# Patient Record
Sex: MALE | Race: WHITE | NOT HISPANIC OR LATINO | Employment: FULL TIME | URBAN - METROPOLITAN AREA
[De-identification: names, ages, dates, MRNs, and addresses within clinical notes are randomized per-mention and may not be internally consistent; named-entity substitution may affect disease eponyms.]

---

## 2017-06-07 ENCOUNTER — TELEPHONE (OUTPATIENT)
Dept: MEDICAL GROUP | Facility: MEDICAL CENTER | Age: 62
End: 2017-06-07

## 2017-06-07 DIAGNOSIS — K62.5 RECTAL BLEEDING: ICD-10-CM

## 2017-09-20 ENCOUNTER — HOSPITAL ENCOUNTER (OUTPATIENT)
Facility: MEDICAL CENTER | Age: 62
End: 2017-09-20
Payer: COMMERCIAL

## 2017-09-20 ENCOUNTER — APPOINTMENT (OUTPATIENT)
Dept: SOCIAL WORK | Facility: CLINIC | Age: 62
End: 2017-09-20

## 2017-09-20 LAB
ANION GAP SERPL CALC-SCNC: 10 MMOL/L (ref 0–11.9)
BUN SERPL-MCNC: 14 MG/DL (ref 8–22)
CALCIUM SERPL-MCNC: 9.3 MG/DL (ref 8.5–10.5)
CHLORIDE SERPL-SCNC: 108 MMOL/L (ref 96–112)
CHOLEST SERPL-MCNC: 213 MG/DL (ref 100–199)
CO2 SERPL-SCNC: 24 MMOL/L (ref 20–33)
CREAT SERPL-MCNC: 0.89 MG/DL (ref 0.5–1.4)
GFR SERPL CREATININE-BSD FRML MDRD: >60 ML/MIN/1.73 M 2
GLUCOSE SERPL-MCNC: 87 MG/DL (ref 65–99)
HDLC SERPL-MCNC: 29 MG/DL
LDLC SERPL CALC-MCNC: 109 MG/DL
POTASSIUM SERPL-SCNC: 3.8 MMOL/L (ref 3.6–5.5)
PSA SERPL-MCNC: 1.57 NG/ML (ref 0–4)
SODIUM SERPL-SCNC: 142 MMOL/L (ref 135–145)
TRIGL SERPL-MCNC: 375 MG/DL (ref 0–149)

## 2017-09-20 PROCEDURE — 90686 IIV4 VACC NO PRSV 0.5 ML IM: CPT | Performed by: REGISTERED NURSE

## 2017-09-25 ENCOUNTER — TELEPHONE (OUTPATIENT)
Dept: MEDICAL GROUP | Facility: MEDICAL CENTER | Age: 62
End: 2017-09-25

## 2017-09-25 NOTE — LETTER
September 25, 2017        Wilfredo Montanez  [address] [facility] [place] [address]        Dear Wilrfedo Nichole:    After careful review of your chart, we have noted you are due for a follow up appointment.  We request you call our office at [phone] at your earliest convenience and make an appointment.      We look forward to scheduling an appointment for you, so that we may provide you with the safest and most complete medical care.        If you have any questions or concerns, please don't hesitate to call.        Sincerely,        LISSY Norman.    Electronically Signed

## 2017-09-25 NOTE — TELEPHONE ENCOUNTER
----- Message from DANIEL Norman sent at 9/22/2017  8:58 PM PDT -----  Please have pt set appointment to review and discuss treatment for labs.

## 2018-02-06 ENCOUNTER — OFFICE VISIT (OUTPATIENT)
Dept: MEDICAL GROUP | Facility: MEDICAL CENTER | Age: 63
End: 2018-02-06
Payer: COMMERCIAL

## 2018-02-06 VITALS
HEIGHT: 72 IN | TEMPERATURE: 97.7 F | OXYGEN SATURATION: 90 % | WEIGHT: 282 LBS | BODY MASS INDEX: 38.19 KG/M2 | DIASTOLIC BLOOD PRESSURE: 70 MMHG | HEART RATE: 67 BPM | SYSTOLIC BLOOD PRESSURE: 110 MMHG

## 2018-02-06 DIAGNOSIS — K92.1 GASTROINTESTINAL HEMORRHAGE WITH MELENA: ICD-10-CM

## 2018-02-06 DIAGNOSIS — K92.1 HEMATOCHEZIA: ICD-10-CM

## 2018-02-06 DIAGNOSIS — E78.5 HYPERLIPIDEMIA LDL GOAL <100: ICD-10-CM

## 2018-02-06 DIAGNOSIS — E66.9 OBESITY (BMI 30-39.9): ICD-10-CM

## 2018-02-06 PROCEDURE — 99214 OFFICE O/P EST MOD 30 MIN: CPT | Performed by: NURSE PRACTITIONER

## 2018-02-06 RX ORDER — GEMFIBROZIL 600 MG/1
600 TABLET, FILM COATED ORAL 2 TIMES DAILY
Qty: 60 TAB | Refills: 2 | Status: SHIPPED | OUTPATIENT
Start: 2018-02-06 | End: 2018-05-14 | Stop reason: SDUPTHER

## 2018-02-06 ASSESSMENT — PATIENT HEALTH QUESTIONNAIRE - PHQ9: CLINICAL INTERPRETATION OF PHQ2 SCORE: 0

## 2018-02-07 NOTE — PROGRESS NOTES
Subjective:     Wilfredo Montanez is a 62 y.o. male who presents with   Chief Complaint   Patient presents with   • Results     Reviewing labs   .    HPI:     Seen in f/u for rectal bleeding.  He has had this since the last year.  He had a referral to GI.    He has lost multiple family members over the last year. Was unable to find time to sched with GI.  Having intermittent bleeding.  No black tarry stools. The blood is on the stool.  Usually only occurs with hard stool.  He is on a high fiber diet. If he goes off his diet he will have the bleeding.  No abd or back pain.  No hemorrhoids that he is aware of.  He has myalgias and weakness to statins.  He is unable to take any statins.   Reviewed lab with pt.  His BMP, GFR is wnl  LP shows trg are up to 375.  HDL is very low at 29.  LDL is ok at 109.  This was done in 9/17.  He just started almonds.  Hopes that will raise his HDL.  He is gaining wt so doesn't think his LP will be improved.  If trg are still elevated will start lopid.  Reviewed risks and benefits of lopid with pt including but not limited to: myalgias and elevated lft          Patient Active Problem List    Diagnosis Date Noted   • Obesity (BMI 30-39.9) 02/06/2018   • Cholinesterase deficiency (CMS-HCC)    • Preventative health care 06/14/2012   • GERD (gastroesophageal reflux disease)    • Skin cancer    • Hyperlipidemia    • Ischemic colitis (CMS-[facility])    • Obesity 06/01/2012   • Dyslipidemia 06/01/2012       Current medicines (including changes today)  Current Outpatient Prescriptions   Medication Sig Dispense Refill   • gemfibrozil (LOPID) 600 MG Tab Take 1 Tab by mouth 2 times a day. 60 Tab 2   • Cholecalciferol (VITAMIN D3) 3000 UNITS TABS Take  by mouth.     • aspirin (ASA) 325 MG TABS Take 325 mg by mouth as needed.     • docosahexanoic acid (OMEGA 3 FA) 1000 MG CAPS Take 1,000 mg by mouth 3 times a day, with meals.     • glucosamine Sulfate 500 MG CAPS Take 500 mg by mouth 3 times a day, with  "meals.     • ascorbic acid (ASCORBIC ACID) 500 MG TABS Take 500 mg by mouth every day.       No current facility-administered medications for this visit.        Allergies   Allergen Reactions   • Codeine    • Statins [Hmg-Coa-R Inhibitors]        ROS  Constitutional: Negative. Negative for fever, chills, weight loss, malaise/fatigue and diaphoresis.   HENT: Negative. Negative for hearing loss, ear pain, nosebleeds, congestion, sore throat, neck pain, tinnitus and ear discharge.   Respiratory: Negative. Negative for cough, hemoptysis, sputum production, shortness of breath, wheezing and stridor.   Cardiovascular: Negative. Negative for chest pain, palpitations, orthopnea, claudication, leg swelling and PND.   Gastrointestinal: Denies nausea, vomiting, diarrhea, constipation, heartburn, melena or hematochezia.  Genitourinary: Denies dysuria, hematuria, urinary incontinence, frequency or urgency.        Objective:     Blood pressure 110/70, pulse 67, temperature 36.5 °C (97.7 °F), height 1.819 m (5' 11.6\"), weight (!) 127.9 kg (282 lb), SpO2 90 %. Body mass index is 38.67 kg/m².    Physical Exam:  Vitals reviewed.  Constitutional: Oriented to person, place, and time. appears well-developed and well-nourished. No distress.   Cardiovascular: Normal rate, regular rhythm, normal heart sounds and intact distal pulses. Exam reveals no gallop and no friction rub. No murmur heard. No carotid bruits.   Pulmonary/Chest: Effort normal and breath sounds normal. No stridor. No respiratory distress. no wheezes or rales. exhibits no tenderness.   Musculoskeletal: Normal range of motion. exhibits no edema. bert pedal pulses 2+.  Lymphadenopathy: No cervical or supraclavicular adenopathy.   Abd:  Bx + x4.  No HSM or abd tenderenss. No CVA tenderness  Neurological: Alert and oriented to person, place, and time. exhibits normal muscle tone.  Skin: Skin is warm and dry. No diaphoresis.   Psychiatric: Normal mood and affect. Behavior is " normal.      Assessment and Plan:     The following treatment plan was discussed:    1. Gastrointestinal hemorrhage with melena  REFERRAL TO GASTROENTEROLOGY    refer GI for evaluation he states that he will f/u with referral this year.  reviewed risk of colon cancer   2. Hematochezia  REFERRAL TO GASTROENTEROLOGY   3. Hyperlipidemia LDL goal <100  LIPID PROFILE    gemfibrozil (LOPID) 600 MG Tab    repeat LP.  if trg still up will start lopid i po daily.  if no s/e inc to bid.  then check CMP,LP WHEN on med for 6 weeks bid.  f/u for lab review   4. Obesity (BMI 30-39.9)  Patient identified as having weight management issue.  Appropriate orders and counseling given.    not on healthy diet or exercising.  enc improvment in both.         Followup: Return in about 6 weeks (around 3/20/2018).

## 2018-02-10 ENCOUNTER — HOSPITAL ENCOUNTER (OUTPATIENT)
Dept: LAB | Facility: MEDICAL CENTER | Age: 63
End: 2018-02-10
Attending: NURSE PRACTITIONER
Payer: COMMERCIAL

## 2018-02-10 DIAGNOSIS — E78.5 HYPERLIPIDEMIA LDL GOAL <100: ICD-10-CM

## 2018-02-10 LAB
CHOLEST SERPL-MCNC: 201 MG/DL (ref 100–199)
HDLC SERPL-MCNC: 31 MG/DL
LDLC SERPL CALC-MCNC: 118 MG/DL
TRIGL SERPL-MCNC: 261 MG/DL (ref 0–149)

## 2018-02-10 PROCEDURE — 36415 COLL VENOUS BLD VENIPUNCTURE: CPT

## 2018-02-10 PROCEDURE — 80061 LIPID PANEL: CPT

## 2018-02-12 ENCOUNTER — TELEPHONE (OUTPATIENT)
Dept: MEDICAL GROUP | Facility: MEDICAL CENTER | Age: 63
End: 2018-02-12

## 2018-02-12 NOTE — TELEPHONE ENCOUNTER
----- Message from DANIEL Lino sent at 2/11/2018  6:07 PM PST -----  Please have pt set appointment to review and discuss treatment for labs.

## 2018-02-20 DIAGNOSIS — E78.5 HYPERLIPIDEMIA LDL GOAL <100: ICD-10-CM

## 2018-04-28 ENCOUNTER — HOSPITAL ENCOUNTER (OUTPATIENT)
Dept: LAB | Facility: MEDICAL CENTER | Age: 63
End: 2018-04-28
Attending: NURSE PRACTITIONER
Payer: COMMERCIAL

## 2018-04-28 LAB
ALBUMIN SERPL BCP-MCNC: 4.3 G/DL (ref 3.2–4.9)
ALBUMIN/GLOB SERPL: 1.6 G/DL
ALP SERPL-CCNC: 43 U/L (ref 30–99)
ALT SERPL-CCNC: 17 U/L (ref 2–50)
ANION GAP SERPL CALC-SCNC: 7 MMOL/L (ref 0–11.9)
AST SERPL-CCNC: 14 U/L (ref 12–45)
BILIRUB SERPL-MCNC: 0.8 MG/DL (ref 0.1–1.5)
BUN SERPL-MCNC: 21 MG/DL (ref 8–22)
CALCIUM SERPL-MCNC: 8.7 MG/DL (ref 8.5–10.5)
CHLORIDE SERPL-SCNC: 109 MMOL/L (ref 96–112)
CHOLEST SERPL-MCNC: 206 MG/DL (ref 100–199)
CO2 SERPL-SCNC: 25 MMOL/L (ref 20–33)
CREAT SERPL-MCNC: 0.97 MG/DL (ref 0.5–1.4)
GLOBULIN SER CALC-MCNC: 2.7 G/DL (ref 1.9–3.5)
GLUCOSE SERPL-MCNC: 99 MG/DL (ref 65–99)
HDLC SERPL-MCNC: 32 MG/DL
LDLC SERPL CALC-MCNC: 139 MG/DL
POTASSIUM SERPL-SCNC: 4 MMOL/L (ref 3.6–5.5)
PROT SERPL-MCNC: 7 G/DL (ref 6–8.2)
SODIUM SERPL-SCNC: 141 MMOL/L (ref 135–145)
TRIGL SERPL-MCNC: 175 MG/DL (ref 0–149)

## 2018-04-28 PROCEDURE — 36415 COLL VENOUS BLD VENIPUNCTURE: CPT

## 2018-04-28 PROCEDURE — 80053 COMPREHEN METABOLIC PANEL: CPT

## 2018-04-28 PROCEDURE — 80061 LIPID PANEL: CPT

## 2018-04-30 ENCOUNTER — TELEPHONE (OUTPATIENT)
Dept: MEDICAL GROUP | Facility: MEDICAL CENTER | Age: 63
End: 2018-04-30

## 2018-04-30 NOTE — TELEPHONE ENCOUNTER
----- Message from DANIEL Lino sent at 4/29/2018  7:51 PM PDT -----  Please have pt set appointment to review and discuss treatment for labs.

## 2018-05-14 ENCOUNTER — OFFICE VISIT (OUTPATIENT)
Dept: MEDICAL GROUP | Facility: MEDICAL CENTER | Age: 63
End: 2018-05-14
Payer: COMMERCIAL

## 2018-05-14 VITALS
DIASTOLIC BLOOD PRESSURE: 82 MMHG | SYSTOLIC BLOOD PRESSURE: 120 MMHG | OXYGEN SATURATION: 92 % | TEMPERATURE: 99 F | HEIGHT: 72 IN | WEIGHT: 283 LBS | HEART RATE: 56 BPM | BODY MASS INDEX: 38.33 KG/M2

## 2018-05-14 DIAGNOSIS — E78.5 HYPERLIPIDEMIA LDL GOAL <100: ICD-10-CM

## 2018-05-14 PROCEDURE — 99214 OFFICE O/P EST MOD 30 MIN: CPT | Performed by: NURSE PRACTITIONER

## 2018-05-14 RX ORDER — GEMFIBROZIL 600 MG/1
600 TABLET, FILM COATED ORAL 2 TIMES DAILY
Qty: 180 TAB | Refills: 1 | Status: SHIPPED | OUTPATIENT
Start: 2018-05-14 | End: 2018-11-06 | Stop reason: SDUPTHER

## 2018-05-14 NOTE — PROGRESS NOTES
Subjective:     Wilfredo Montanez is a 62 y.o. male who presents with hyperlipidemia.    HPI:   Seen in f/u for hyperlipidemia.  He is on lopid.  Is concerned that it may have inc his BUN.  Otherwise dunia well.  Last visit his trg were up to 261.  He reports that its familial.  His HDL was too low.  LDL was too high at 118.   Reviewed new lab with pt.  Is CMP, GFR is wnl.  LP shows trg down to 175.  HDL up sl to 32.  LDL is up from 118 to 139 with goal of <100.  Intolerant of statins.  His trg were up to 375 in 9/17.    Is mostly on a healthy diet.  He has stopped eating fatty meats.  He is doing mild exercise 4x/wk.  Walks at work.      Patient Active Problem List    Diagnosis Date Noted   • Obesity (BMI 30-39.9) 02/06/2018   • Cholinesterase deficiency (HCC)    • Preventative health care 06/14/2012   • GERD (gastroesophageal reflux disease)    • Skin cancer    • Hyperlipidemia    • Ischemic colitis (HCC)    • Obesity 06/01/2012   • Dyslipidemia 06/01/2012       Current medicines (including changes today)  Current Outpatient Prescriptions   Medication Sig Dispense Refill   • gemfibrozil (LOPID) 600 MG Tab Take 1 Tab by mouth 2 times a day. 180 Tab 1   • Cholecalciferol (VITAMIN D3) 3000 UNITS TABS Take  by mouth.     • aspirin (ASA) 325 MG TABS Take 325 mg by mouth as needed.     • docosahexanoic acid (OMEGA 3 FA) 1000 MG CAPS Take 1,000 mg by mouth 3 times a day, with meals.     • glucosamine Sulfate 500 MG CAPS Take 500 mg by mouth 3 times a day, with meals.     • ascorbic acid (ASCORBIC ACID) 500 MG TABS Take 500 mg by mouth every day.       No current facility-administered medications for this visit.        Allergies   Allergen Reactions   • Codeine    • Statins [Hmg-Coa-R Inhibitors]        ROS  Constitutional: Negative. Negative for fever, chills, weight loss, malaise/fatigue and diaphoresis.   HENT: Negative. Negative for hearing loss, ear pain, nosebleeds, congestion, sore throat, neck pain, tinnitus and  "ear discharge.   Respiratory: Negative. Negative for cough, hemoptysis, sputum production, shortness of breath, wheezing and stridor.   Cardiovascular: Negative. Negative for chest pain, palpitations, orthopnea, claudication, leg swelling and PND.   Gastrointestinal: Denies nausea, vomiting, diarrhea, constipation, heartburn, melena or hematochezia.  Genitourinary: Denies dysuria, hematuria, urinary incontinence, frequency or urgency.        Objective:     Blood pressure 120/82, pulse (!) 56, temperature 37.2 °C (99 °F), height 1.819 m (5' 11.6\"), weight (!) 128.4 kg (283 lb), SpO2 92 %. Body mass index is 38.81 kg/m².    Physical Exam:  Vitals reviewed.  Constitutional: Oriented to person, place, and time. appears well-developed and well-nourished. No distress.   Cardiovascular: Normal rate, regular rhythm, normal heart sounds and intact distal pulses. Exam reveals no gallop and no friction rub. No murmur heard. No carotid bruits.   Pulmonary/Chest: Effort normal and breath sounds normal. No stridor. No respiratory distress. no wheezes or rales. exhibits no tenderness.   Musculoskeletal: Normal range of motion. exhibits no edema. bert pedal pulses 2+.  Lymphadenopathy: No cervical or supraclavicular adenopathy.   Neurological: Alert and oriented to person, place, and time. exhibits normal muscle tone.  Skin: Skin is warm and dry. No diaphoresis.   Psychiatric: Normal mood and affect. Behavior is normal.      Assessment and Plan:     The following treatment plan was discussed:    1. Hyperlipidemia LDL goal <100  gemfibrozil (LOPID) 600 MG Tab    trg down and better but not at goal.  improve diet and exercise.  HDL still low chronically.  LDL is coming up from 109 in 9/17 to 139           Followup: Return in about 6 months (around 11/14/2018).  "

## 2018-05-17 ENCOUNTER — OFFICE VISIT (OUTPATIENT)
Dept: MEDICAL GROUP | Facility: MEDICAL CENTER | Age: 63
End: 2018-05-17
Payer: COMMERCIAL

## 2018-05-17 VITALS
WEIGHT: 275 LBS | OXYGEN SATURATION: 94 % | SYSTOLIC BLOOD PRESSURE: 120 MMHG | DIASTOLIC BLOOD PRESSURE: 80 MMHG | TEMPERATURE: 98.7 F | HEART RATE: 70 BPM | HEIGHT: 72 IN | BODY MASS INDEX: 37.25 KG/M2

## 2018-05-17 DIAGNOSIS — M54.50 LOW BACK PAIN RADIATING TO RIGHT LEG: ICD-10-CM

## 2018-05-17 DIAGNOSIS — M79.604 LOW BACK PAIN RADIATING TO RIGHT LEG: ICD-10-CM

## 2018-05-17 PROCEDURE — 99214 OFFICE O/P EST MOD 30 MIN: CPT | Performed by: NURSE PRACTITIONER

## 2018-05-17 RX ORDER — TIZANIDINE 4 MG/1
4 TABLET ORAL
Qty: 15 TAB | Refills: 0 | Status: SHIPPED | OUTPATIENT
Start: 2018-05-17 | End: 2019-01-28

## 2018-05-17 RX ORDER — METHYLPREDNISOLONE 4 MG/1
TABLET ORAL
Qty: 21 TAB | Refills: 0 | Status: SHIPPED | OUTPATIENT
Start: 2018-05-17 | End: 2019-01-28

## 2018-05-17 NOTE — PROGRESS NOTES
Subjective:     Wilfredo Montanez is a 62 y.o. male who presents with LBP.    HPI:   Seen in f/u for LBP.  Yesterday am he woke up with back tightness and muscle spasms.  He went ahead to work and it got worse.  Pain in lower center of back. No leg pain today.    He has had rt anterior leg numbness for awhile. That was also noted yesterday with burning.    Didn't do to work today.    Using asa for the pain.    No saddle anesthesia.  No bowel or bladder incontinence.    No hx of injury.      Patient Active Problem List    Diagnosis Date Noted   • Obesity (BMI 30-39.9) 02/06/2018   • Cholinesterase deficiency (HCC)    • Preventative health care 06/14/2012   • GERD (gastroesophageal reflux disease)    • Skin cancer    • Hyperlipidemia    • Ischemic colitis (HCC)    • Obesity 06/01/2012   • Dyslipidemia 06/01/2012       Current medicines (including changes today)  Current Outpatient Prescriptions   Medication Sig Dispense Refill   • MethylPREDNISolone (MEDROL DOSEPAK) 4 MG Tablet Therapy Pack As directed on the packaging label. 21 Tab 0   • tizanidine (ZANAFLEX) 4 MG Tab Take 1 Tab by mouth every bedtime. 15 Tab 0   • gemfibrozil (LOPID) 600 MG Tab Take 1 Tab by mouth 2 times a day. 180 Tab 1   • Cholecalciferol (VITAMIN D3) 3000 UNITS TABS Take  by mouth.     • aspirin (ASA) 325 MG TABS Take 325 mg by mouth as needed.     • docosahexanoic acid (OMEGA 3 FA) 1000 MG CAPS Take 1,000 mg by mouth 3 times a day, with meals.     • glucosamine Sulfate 500 MG CAPS Take 500 mg by mouth 3 times a day, with meals.     • ascorbic acid (ASCORBIC ACID) 500 MG TABS Take 500 mg by mouth every day.       No current facility-administered medications for this visit.        Allergies   Allergen Reactions   • Codeine    • Statins [Hmg-Coa-R Inhibitors]        ROS  Constitutional: Negative. Negative for fever, chills, weight loss, malaise/fatigue and diaphoresis.   HENT: Negative. Negative for hearing loss, ear pain, nosebleeds, congestion,  "sore throat, neck pain, tinnitus and ear discharge.   Respiratory: Negative. Negative for cough, hemoptysis, sputum production, shortness of breath, wheezing and stridor.   Cardiovascular: Negative. Negative for chest pain, palpitations, orthopnea, claudication, leg swelling and PND.   Gastrointestinal: Denies nausea, vomiting, diarrhea, constipation, heartburn, melena or hematochezia.  Genitourinary: Denies dysuria, hematuria, urinary incontinence, frequency or urgency.        Objective:     Blood pressure 120/80, pulse 70, temperature 37.1 °C (98.7 °F), height 1.819 m (5' 11.6\"), weight 124.7 kg (275 lb), SpO2 94 %. Body mass index is 37.71 kg/m².    Physical Exam:  Vitals reviewed.  Constitutional: Oriented to person, place, and time. appears well-developed and well-nourished. No distress.   Cardiovascular: Normal rate, regular rhythm, normal heart sounds and intact distal pulses. Exam reveals no gallop and no friction rub. No murmur heard. No carotid bruits.   Pulmonary/Chest: Effort normal and breath sounds normal. No stridor. No respiratory distress. no wheezes or rales. exhibits no tenderness.   Musculoskeletal: exhibits no edema. bert pedal pulses 2+.  Lymphadenopathy: No cervical or supraclavicular adenopathy.   Neurological: Alert and oriented to person, place, and time. exhibits normal muscle tone.  Skin: Skin is warm and dry. No diaphoresis.   Psychiatric: Normal mood and affect. Behavior is normal.      Assessment and Plan:     The following treatment plan was discussed:    1. Low back pain radiating to right leg  MethylPREDNISolone (MEDROL DOSEPAK) 4 MG Tablet Therapy Pack    tizanidine (ZANAFLEX) 4 MG Tab    new onset of LBP with radiation to rt leg.   use medrol dosepak and tizanidine.  f/u if sx not improved with tx.  consider PT, xray.           Followup: Return if symptoms worsen or fail to improve.  "

## 2018-10-11 ENCOUNTER — IMMUNIZATION (OUTPATIENT)
Dept: SOCIAL WORK | Facility: CLINIC | Age: 63
End: 2018-10-11
Payer: COMMERCIAL

## 2018-10-11 DIAGNOSIS — Z23 NEED FOR VACCINATION: ICD-10-CM

## 2018-10-11 PROCEDURE — 90471 IMMUNIZATION ADMIN: CPT | Performed by: REGISTERED NURSE

## 2018-10-11 PROCEDURE — 90686 IIV4 VACC NO PRSV 0.5 ML IM: CPT | Performed by: REGISTERED NURSE

## 2018-10-31 DIAGNOSIS — Z12.5 SCREENING FOR MALIGNANT NEOPLASM OF PROSTATE: ICD-10-CM

## 2018-10-31 DIAGNOSIS — E78.5 HYPERLIPIDEMIA LDL GOAL <100: ICD-10-CM

## 2018-11-03 ENCOUNTER — HOSPITAL ENCOUNTER (OUTPATIENT)
Dept: LAB | Facility: MEDICAL CENTER | Age: 63
End: 2018-11-03
Attending: NURSE PRACTITIONER
Payer: COMMERCIAL

## 2018-11-03 DIAGNOSIS — Z12.5 SCREENING FOR MALIGNANT NEOPLASM OF PROSTATE: ICD-10-CM

## 2018-11-03 DIAGNOSIS — E78.5 HYPERLIPIDEMIA LDL GOAL <100: ICD-10-CM

## 2018-11-03 LAB
ALBUMIN SERPL BCP-MCNC: 4.2 G/DL (ref 3.2–4.9)
ALBUMIN/GLOB SERPL: 1.6 G/DL
ALP SERPL-CCNC: 48 U/L (ref 30–99)
ALT SERPL-CCNC: 21 U/L (ref 2–50)
ANION GAP SERPL CALC-SCNC: 8 MMOL/L (ref 0–11.9)
AST SERPL-CCNC: 21 U/L (ref 12–45)
BILIRUB SERPL-MCNC: 0.9 MG/DL (ref 0.1–1.5)
BUN SERPL-MCNC: 20 MG/DL (ref 8–22)
CALCIUM SERPL-MCNC: 9.3 MG/DL (ref 8.5–10.5)
CHLORIDE SERPL-SCNC: 110 MMOL/L (ref 96–112)
CHOLEST SERPL-MCNC: 206 MG/DL (ref 100–199)
CO2 SERPL-SCNC: 23 MMOL/L (ref 20–33)
CREAT SERPL-MCNC: 0.97 MG/DL (ref 0.5–1.4)
FASTING STATUS PATIENT QL REPORTED: NORMAL
GLOBULIN SER CALC-MCNC: 2.6 G/DL (ref 1.9–3.5)
GLUCOSE SERPL-MCNC: 106 MG/DL (ref 65–99)
HDLC SERPL-MCNC: 30 MG/DL
LDLC SERPL CALC-MCNC: 127 MG/DL
POTASSIUM SERPL-SCNC: 3.9 MMOL/L (ref 3.6–5.5)
PROT SERPL-MCNC: 6.8 G/DL (ref 6–8.2)
SODIUM SERPL-SCNC: 141 MMOL/L (ref 135–145)
TRIGL SERPL-MCNC: 247 MG/DL (ref 0–149)

## 2018-11-03 PROCEDURE — 80061 LIPID PANEL: CPT

## 2018-11-03 PROCEDURE — 84154 ASSAY OF PSA FREE: CPT

## 2018-11-03 PROCEDURE — 84153 ASSAY OF PSA TOTAL: CPT

## 2018-11-03 PROCEDURE — 36415 COLL VENOUS BLD VENIPUNCTURE: CPT

## 2018-11-03 PROCEDURE — 80053 COMPREHEN METABOLIC PANEL: CPT

## 2018-11-04 ENCOUNTER — TELEPHONE (OUTPATIENT)
Dept: MEDICAL GROUP | Facility: MEDICAL CENTER | Age: 63
End: 2018-11-04

## 2018-11-06 LAB
PSA FREE MFR SERPL: 29 %
PSA FREE SERPL-MCNC: 0.4 NG/ML
PSA SERPL-MCNC: 1.4 NG/ML (ref 0–4)

## 2018-11-15 ENCOUNTER — TELEPHONE (OUTPATIENT)
Dept: MEDICAL GROUP | Facility: MEDICAL CENTER | Age: 63
End: 2018-11-15

## 2018-11-15 NOTE — TELEPHONE ENCOUNTER
ESTABLISHED PATIENT PRE-VISIT PLANNING     Patient was NOT contacted to complete PVP.     Note: Patient will not be contacted if there is no indication to call.     1.  Reviewed notes from the last few office visits within the medical group: Yes    2.  If any orders were placed at last visit or intended to be done for this visit (i.e. 6 mos follow-up), do we have Results/Consult Notes?        •  Labs - Labs ordered, completed on 11/3/18 and results are in chart.   Note: If patient appointment is for lab review and patient did not complete labs, check with provider if OK to reschedule patient until labs completed.       •  Imaging - Imaging was not ordered at last office visit.       •  Referrals - No referrals were ordered at last office visit.    3. Is this appointment scheduled as a Hospital Follow-Up? No    4.  Immunizations were updated in [employer] using Web[employer]?: Yes       •  Web [employer] Recommendations: FLU, TDAP and ZOSTAVAX (Shingles)    5.  Patient is due for the following Health Maintenance Topics:   Health Maintenance Due   Topic Date Due   • IMM DTaP/Tdap/Td Vaccine (1 - Tdap) 07/13/1973   • IMM ZOSTER VACCINES (1 of 2) 07/13/2004   • IMM INFLUENZA (1) 09/01/2018         6.  MDX printed for Provider? NO    7.  Patient was NOT informed to arrive 15 min prior to their scheduled appointment and bring in their medication bottles.

## 2018-11-20 ENCOUNTER — OFFICE VISIT (OUTPATIENT)
Dept: MEDICAL GROUP | Facility: MEDICAL CENTER | Age: 63
End: 2018-11-20
Payer: COMMERCIAL

## 2018-11-20 VITALS
HEART RATE: 95 BPM | DIASTOLIC BLOOD PRESSURE: 88 MMHG | SYSTOLIC BLOOD PRESSURE: 124 MMHG | WEIGHT: 285 LBS | HEIGHT: 72 IN | TEMPERATURE: 98.3 F | OXYGEN SATURATION: 98 % | BODY MASS INDEX: 38.6 KG/M2

## 2018-11-20 DIAGNOSIS — Z23 NEED FOR SHINGLES VACCINE: ICD-10-CM

## 2018-11-20 DIAGNOSIS — R73.01 IMPAIRED FASTING GLUCOSE: ICD-10-CM

## 2018-11-20 DIAGNOSIS — Z23 NEED FOR TETANUS, DIPHTHERIA, AND ACELLULAR PERTUSSIS (TDAP) VACCINE: ICD-10-CM

## 2018-11-20 DIAGNOSIS — L72.0 MILIAL CYST: ICD-10-CM

## 2018-11-20 DIAGNOSIS — E78.2 HYPERLIPIDEMIA, MIXED: ICD-10-CM

## 2018-11-20 PROCEDURE — 90715 TDAP VACCINE 7 YRS/> IM: CPT | Performed by: NURSE PRACTITIONER

## 2018-11-20 PROCEDURE — 99214 OFFICE O/P EST MOD 30 MIN: CPT | Mod: 25 | Performed by: NURSE PRACTITIONER

## 2018-11-20 PROCEDURE — 90471 IMMUNIZATION ADMIN: CPT | Performed by: NURSE PRACTITIONER

## 2018-11-20 NOTE — NON-PROVIDER
"  Subjective:     Wilfredo Montanez is a 63 y.o. male who presents with hyperlipidemia.    HPI:   Wilfredo is here to f/u on hyperlipidemia.   His LP is abnormal: Trig 246, HDL 30, . Says he knows he eats things that raise his bad cholesterol but \"what's the point of living if you can't enjoy it.\"  At the same time says he and his wife joined weight watchers and are trying to get healthy. He took Advicor in the past and suffered blisters, jaw pain, fatigue, and muscle pain. He does not want statin therapy at this time.    Fasting glucose high at 106.  GFR, CMP, PSA were wnl.  Reviewed labs with pt.     He has a skin lesion one inch to right of right nostril.    He is due TDAP.    Patient Active Problem List    Diagnosis Date Noted   • Obesity (BMI 30-39.9) 02/06/2018   • Cholinesterase deficiency (HCC)    • Preventative health care 06/14/2012   • GERD (gastroesophageal reflux disease)    • Skin cancer    • Hyperlipidemia    • Ischemic colitis (HCC)    • Obesity 06/01/2012   • Dyslipidemia 06/01/2012       Current medicines (including changes today)  Current Outpatient Prescriptions   Medication Sig Dispense Refill   • Zoster Vac Recomb Adjuvanted (SHINGRIX) 50 MCG Recon Susp 0.5 mL by Intramuscular route Once for 1 dose. 0.5 mL 0   • gemfibrozil (LOPID) 600 MG Tab TAKE 1 TABLET BY MOUTH TWICE A  Tab 0   • MethylPREDNISolone (MEDROL DOSEPAK) 4 MG Tablet Therapy Pack As directed on the packaging label. 21 Tab 0   • tizanidine (ZANAFLEX) 4 MG Tab Take 1 Tab by mouth every bedtime. 15 Tab 0   • Cholecalciferol (VITAMIN D3) 3000 UNITS TABS Take  by mouth.     • aspirin (ASA) 325 MG TABS Take 325 mg by mouth as needed.     • docosahexanoic acid (OMEGA 3 FA) 1000 MG CAPS Take 1,000 mg by mouth 3 times a day, with meals.     • glucosamine Sulfate 500 MG CAPS Take 500 mg by mouth 3 times a day, with meals.     • ascorbic acid (ASCORBIC ACID) 500 MG TABS Take 500 mg by mouth every day.       No current " "facility-administered medications for this visit.        Allergies   Allergen Reactions   • Codeine    • Statins [Hmg-Coa-R Inhibitors]        ROS  Constitutional: Negative. Negative for fever, chills, weight loss, malaise/fatigue and diaphoresis.   HENT: Negative. Negative for hearing loss, ear pain, nosebleeds, congestion, sore throat, neck pain, tinnitus and ear discharge.   Respiratory: Negative. Negative for cough, hemoptysis, sputum production, shortness of breath, wheezing and stridor.   Cardiovascular: Negative. Negative for chest pain, palpitations, orthopnea, claudication, leg swelling and PND.   Gastrointestinal: Denies nausea, vomiting, diarrhea, constipation, heartburn, melena or hematochezia.  Genitourinary: Denies dysuria, hematuria, urinary incontinence, frequency or urgency.        Objective:     Blood pressure 124/88, pulse 95, temperature 36.8 °C (98.3 °F), temperature source Temporal, height 1.819 m (5' 11.6\"), weight (!) 129.3 kg (285 lb), SpO2 98 %. Body mass index is 39.09 kg/m².    Physical Exam:  Vitals reviewed.  Constitutional: Oriented to person, place, and time. appears well-developed and well-nourished. No distress.   Cardiovascular: Normal rate, regular rhythm, normal heart sounds and intact distal pulses. Exam reveals no gallop and no friction rub. No murmur heard. No carotid bruits.   Pulmonary/Chest: Effort normal and breath sounds normal. No stridor. No respiratory distress. no wheezes or rales. exhibits no tenderness.   Musculoskeletal: Normal range of motion. exhibits no edema. bert pedal pulses 2+.  Neurological: Alert and oriented to person, place, and time. exhibits normal muscle tone.  Skin: Skin is warm and dry. No diaphoresis. Milia lesion to medial cheek to right of nose with several smaller milia nearby.    Psychiatric: Normal mood and affect. Behavior is normal.      Assessment and Plan:     The following treatment plan was discussed:    1. Hyperlipidemia, mixed      Trig " 246, HDL 30, . Pt does not want statin therapy. Reinforced heart healthy diet and exercise. f/u labs in 6 mos.   2. Impaired fasting glucose      High at 106.  Advised pt to reduce carb and sweet intake. f/u labs in 6 mos.   3. Milial cyst      R medial cheek near nose with several smaller milia nearby.  Advised pt to apply OTC differin gel.    4. Need for tetanus, diphtheria, and acellular pertussis (Tdap) vaccine  Tdap =>8yo IM    Given in office today   5. Need for shingles vaccine  Zoster Vac Recomb Adjuvanted (SHINGRIX) 50 MCG Recon Susp    Rx given           Followup: Return in about 6 months (around 5/20/2019).

## 2018-11-26 DIAGNOSIS — E78.5 HYPERLIPIDEMIA LDL GOAL <100: ICD-10-CM

## 2018-11-26 RX ORDER — ATORVASTATIN CALCIUM 10 MG/1
10 TABLET, FILM COATED ORAL
Qty: 8 TAB | Refills: 2 | Status: SHIPPED | OUTPATIENT
Start: 2018-11-26 | End: 2019-02-09 | Stop reason: SDUPTHER

## 2019-01-07 ENCOUNTER — HOSPITAL ENCOUNTER (OUTPATIENT)
Dept: LAB | Facility: MEDICAL CENTER | Age: 64
End: 2019-01-07
Attending: NURSE PRACTITIONER
Payer: COMMERCIAL

## 2019-01-07 DIAGNOSIS — E78.5 HYPERLIPIDEMIA LDL GOAL <100: ICD-10-CM

## 2019-01-07 LAB
ALBUMIN SERPL BCP-MCNC: 4.4 G/DL (ref 3.2–4.9)
ALBUMIN/GLOB SERPL: 1.6 G/DL
ALP SERPL-CCNC: 50 U/L (ref 30–99)
ALT SERPL-CCNC: 22 U/L (ref 2–50)
ANION GAP SERPL CALC-SCNC: 11 MMOL/L (ref 0–11.9)
AST SERPL-CCNC: 20 U/L (ref 12–45)
BILIRUB SERPL-MCNC: 0.9 MG/DL (ref 0.1–1.5)
BUN SERPL-MCNC: 20 MG/DL (ref 8–22)
CALCIUM SERPL-MCNC: 9.2 MG/DL (ref 8.5–10.5)
CHLORIDE SERPL-SCNC: 107 MMOL/L (ref 96–112)
CHOLEST SERPL-MCNC: 197 MG/DL (ref 100–199)
CO2 SERPL-SCNC: 24 MMOL/L (ref 20–33)
CREAT SERPL-MCNC: 1 MG/DL (ref 0.5–1.4)
FASTING STATUS PATIENT QL REPORTED: NORMAL
GLOBULIN SER CALC-MCNC: 2.8 G/DL (ref 1.9–3.5)
GLUCOSE SERPL-MCNC: 93 MG/DL (ref 65–99)
HDLC SERPL-MCNC: 29 MG/DL
LDLC SERPL CALC-MCNC: 119 MG/DL
POTASSIUM SERPL-SCNC: 3.9 MMOL/L (ref 3.6–5.5)
PROT SERPL-MCNC: 7.2 G/DL (ref 6–8.2)
SODIUM SERPL-SCNC: 142 MMOL/L (ref 135–145)
TRIGL SERPL-MCNC: 243 MG/DL (ref 0–149)

## 2019-01-07 PROCEDURE — 36415 COLL VENOUS BLD VENIPUNCTURE: CPT

## 2019-01-07 PROCEDURE — 80053 COMPREHEN METABOLIC PANEL: CPT

## 2019-01-07 PROCEDURE — 80061 LIPID PANEL: CPT

## 2019-01-28 ENCOUNTER — OFFICE VISIT (OUTPATIENT)
Dept: MEDICAL GROUP | Facility: MEDICAL CENTER | Age: 64
End: 2019-01-28
Payer: COMMERCIAL

## 2019-01-28 VITALS
HEIGHT: 72 IN | OXYGEN SATURATION: 93 % | SYSTOLIC BLOOD PRESSURE: 120 MMHG | TEMPERATURE: 98.9 F | WEIGHT: 276 LBS | DIASTOLIC BLOOD PRESSURE: 80 MMHG | HEART RATE: 85 BPM | BODY MASS INDEX: 37.38 KG/M2

## 2019-01-28 DIAGNOSIS — E78.5 HYPERLIPIDEMIA LDL GOAL <100: ICD-10-CM

## 2019-01-28 PROCEDURE — 99214 OFFICE O/P EST MOD 30 MIN: CPT | Performed by: NURSE PRACTITIONER

## 2019-01-28 ASSESSMENT — PATIENT HEALTH QUESTIONNAIRE - PHQ9: CLINICAL INTERPRETATION OF PHQ2 SCORE: 0

## 2019-01-29 NOTE — PROGRESS NOTES
Subjective:     Wilfredo Montanez is a 63 y.o. male who presents with hyperlipidemia.    HPI:   Seen in f/u for hyperlipidemia.  He is feeling well.   He was started on lipitor at last appt.  Then came the holidays.  Gained some wt right away.  Taking med approp 2x/wk.  dunia well.  Since the holidays he has returned to healthy diet and exercise.  Already lost 12 lbs.  Still taking lipitor approp.    Reviewed lab with pt.  CMP, GFR is wnl.    LP shows trg still elevated.  They were 247 and now 243.  HDL still low. Down from 30 to 29. This is chronic.  LDL is down from 127 to 119.    He remains on both lopid and lipitor.  No myalgia.     He is on shredded wheat in am small amt with all bran.  GI told him to eat more fiber.  Both he and his   Wife are eating very healthy now.     Patient Active Problem List    Diagnosis Date Noted   • Obesity (BMI 30-39.9) 02/06/2018   • Cholinesterase deficiency (HCC)    • Preventative health care 06/14/2012   • GERD (gastroesophageal reflux disease)    • Skin cancer    • Hyperlipidemia    • Ischemic colitis (HCC)    • Obesity 06/01/2012   • Dyslipidemia 06/01/2012       Current medicines (including changes today)  Current Outpatient Prescriptions   Medication Sig Dispense Refill   • atorvastatin (LIPITOR) 10 MG Tab Take 1 Tab by mouth every 72 hours. 8 Tab 2   • gemfibrozil (LOPID) 600 MG Tab TAKE 1 TABLET BY MOUTH TWICE A  Tab 0   • Cholecalciferol (VITAMIN D3) 3000 UNITS TABS Take  by mouth.     • aspirin (ASA) 325 MG TABS Take 325 mg by mouth as needed.     • docosahexanoic acid (OMEGA 3 FA) 1000 MG CAPS Take 1,000 mg by mouth 3 times a day, with meals.     • glucosamine Sulfate 500 MG CAPS Take 500 mg by mouth 3 times a day, with meals.     • ascorbic acid (ASCORBIC ACID) 500 MG TABS Take 500 mg by mouth every day.       No current facility-administered medications for this visit.        Allergies   Allergen Reactions   • Codeine    • Statins [Hmg-Coa-R Inhibitors]   "      ROS  Constitutional: Negative. Negative for fever, chills, weight loss, malaise/fatigue and diaphoresis.   HENT: Negative. Negative for hearing loss, ear pain, nosebleeds, congestion, sore throat, neck pain, tinnitus and ear discharge.   Respiratory: Negative. Negative for cough, hemoptysis, sputum production, shortness of breath, wheezing and stridor.   Cardiovascular: Negative. Negative for chest pain, palpitations, orthopnea, claudication, leg swelling and PND.   Gastrointestinal: Denies nausea, vomiting, diarrhea, constipation, heartburn, melena or hematochezia.  Genitourinary: Denies dysuria, hematuria, urinary incontinence, frequency or urgency.        Objective:     Blood pressure 120/80, pulse 85, temperature 37.2 °C (98.9 °F), temperature source Temporal, height 1.819 m (5' 11.6\"), weight (!) 125.2 kg (276 lb), SpO2 93 %. Body mass index is 37.85 kg/m².    Physical Exam:  Vitals reviewed.  Constitutional: Oriented to person, place, and time. appears well-developed and well-nourished. No distress.   Cardiovascular: Normal rate, regular rhythm, normal heart sounds and intact distal pulses. Exam reveals no gallop and no friction rub. No murmur heard. No carotid bruits.   Pulmonary/Chest: Effort normal and breath sounds normal. No stridor. No respiratory distress. no wheezes or rales. exhibits no tenderness.   Musculoskeletal: Normal range of motion. exhibits no edema. bert pedal pulses 2+.  Lymphadenopathy: No cervical or supraclavicular adenopathy.   Neurological: Alert and oriented to person, place, and time. exhibits normal muscle tone.  Skin: Skin is warm and dry. No diaphoresis.   Psychiatric: Normal mood and affect. Behavior is normal.      Assessment and Plan:     The following treatment plan was discussed:    1. Hyperlipidemia LDL goal <100  HEPATIC FUNCTION PANEL    Lipid Profile    pt wants to improve diet and exercise before inc med.  continue 3x/wk lipitor with bid lopid.  recheck lab 6 " [facility].  f/u for review.          Followup: Return in about 6 months (around 7/28/2019).

## 2019-05-15 ENCOUNTER — OFFICE VISIT (OUTPATIENT)
Dept: MEDICAL GROUP | Facility: MEDICAL CENTER | Age: 64
End: 2019-05-15
Payer: COMMERCIAL

## 2019-05-15 VITALS
HEART RATE: 67 BPM | HEIGHT: 72 IN | TEMPERATURE: 97.7 F | SYSTOLIC BLOOD PRESSURE: 110 MMHG | BODY MASS INDEX: 33.46 KG/M2 | DIASTOLIC BLOOD PRESSURE: 72 MMHG | WEIGHT: 247 LBS | OXYGEN SATURATION: 97 %

## 2019-05-15 DIAGNOSIS — L08.9 INFECTED ABRASION OF RIGHT THUMB, INITIAL ENCOUNTER: ICD-10-CM

## 2019-05-15 DIAGNOSIS — S60.311A INFECTED ABRASION OF RIGHT THUMB, INITIAL ENCOUNTER: ICD-10-CM

## 2019-05-15 PROCEDURE — 99214 OFFICE O/P EST MOD 30 MIN: CPT | Performed by: NURSE PRACTITIONER

## 2019-05-15 RX ORDER — SULFAMETHOXAZOLE AND TRIMETHOPRIM 800; 160 MG/1; MG/1
1 TABLET ORAL 2 TIMES DAILY
Qty: 14 TAB | Refills: 0 | Status: SHIPPED | OUTPATIENT
Start: 2019-05-15 | End: 2020-12-08

## 2019-05-15 NOTE — PROGRESS NOTES
Subjective:     Wilfredo Montanez is a 63 y.o. male who presents with rt thumb splinter.    HPI:   Seen in f/u for rt thumb splinter.  He got a piece of wood stuck in his thumb.  Got most of it out but thinks that it may be more in the area.  Draining yellow and brown dg.  + red and swollen.  No fever, chills or sweating.  Thumb is painful.  He is up to date on his Tdap.    He has lost 27 lbs since last visit with healthy diet and exercise.     Patient Active Problem List    Diagnosis Date Noted   • Obesity (BMI 30-39.9) 02/06/2018   • Cholinesterase deficiency (HCC)    • Preventative health care 06/14/2012   • GERD (gastroesophageal reflux disease)    • Skin cancer    • Hyperlipidemia    • Ischemic colitis (HCC)    • Obesity 06/01/2012   • Dyslipidemia 06/01/2012       Current medicines (including changes today)  Current Outpatient Prescriptions   Medication Sig Dispense Refill   • sulfamethoxazole-trimethoprim (BACTRIM DS) 800-160 MG tablet Take 1 Tab by mouth 2 times a day. 14 Tab 0   • atorvastatin (LIPITOR) 10 MG Tab Take 1 Tab by mouth every 72 hours. 8 Tab 0   • gemfibrozil (LOPID) 600 MG Tab TAKE 1 TABLET BY MOUTH TWICE A  Tab 0   • Cholecalciferol (VITAMIN D3) 3000 UNITS TABS Take  by mouth.     • aspirin (ASA) 325 MG TABS Take 325 mg by mouth as needed.     • docosahexanoic acid (OMEGA 3 FA) 1000 MG CAPS Take 1,000 mg by mouth 3 times a day, with meals.     • glucosamine Sulfate 500 MG CAPS Take 500 mg by mouth 3 times a day, with meals.     • ascorbic acid (ASCORBIC ACID) 500 MG TABS Take 500 mg by mouth every day.       No current facility-administered medications for this visit.        Allergies   Allergen Reactions   • Codeine    • Statins [Hmg-Coa-R Inhibitors]        ROS  Constitutional: Negative. Negative for fever, chills, weight loss, malaise/fatigue and diaphoresis.   HENT: Negative. Negative for hearing loss, ear pain, nosebleeds, congestion, sore throat, neck pain, tinnitus and ear  "discharge.   Respiratory: Negative. Negative for cough, hemoptysis, sputum production, shortness of breath, wheezing and stridor.   Cardiovascular: Negative. Negative for chest pain, palpitations, orthopnea, claudication, leg swelling and PND.   Gastrointestinal: Denies nausea, vomiting, diarrhea, constipation, heartburn, melena or hematochezia.  Genitourinary: Denies dysuria, hematuria, urinary incontinence, frequency or urgency.        Objective:     /72 (BP Location: Right arm, Patient Position: Sitting)   Pulse 67   Temp 36.5 °C (97.7 °F) (Temporal)   Ht 1.819 m (5' 11.6\")   Wt 112 kg (247 lb)   SpO2 97%  Body mass index is 33.87 kg/m².    Physical Exam:  Vitals reviewed.  Constitutional: Oriented to person, place, and time. appears well-developed and well-nourished. No distress.   Cardiovascular: Normal rate, regular rhythm, normal heart sounds and intact distal pulses. Exam reveals no gallop and no friction rub. No murmur heard. No carotid bruits.   Pulmonary/Chest: Effort normal and breath sounds normal. No stridor. No respiratory distress. no wheezes or rales. exhibits no tenderness.   Musculoskeletal: Normal range of motion. exhibits no edema. bert pedal pulses 2+.  Lymphadenopathy: No cervical or supraclavicular adenopathy.   Neurological: Alert and oriented to person, place, and time. exhibits normal muscle tone.  Skin: Skin is warm and dry. No diaphoresis.   Psychiatric: Normal mood and affect. Behavior is normal.      Assessment and Plan:     The following treatment plan was discussed:    1. Infected abrasion of right thumb, initial encounter  sulfamethoxazole-trimethoprim (BACTRIM DS) 800-160 MG tablet    bactrim x 7 days.  f/u if sx not improved with tx.          Followup: Return if symptoms worsen or fail to improve.  "

## 2019-10-24 ENCOUNTER — IMMUNIZATION (OUTPATIENT)
Dept: SOCIAL WORK | Facility: CLINIC | Age: 64
End: 2019-10-24
Payer: COMMERCIAL

## 2019-10-24 DIAGNOSIS — Z23 NEED FOR VACCINATION: ICD-10-CM

## 2019-10-24 PROCEDURE — 90471 IMMUNIZATION ADMIN: CPT | Performed by: REGISTERED NURSE

## 2019-10-24 PROCEDURE — 90662 IIV NO PRSV INCREASED AG IM: CPT | Performed by: REGISTERED NURSE

## 2020-05-20 ENCOUNTER — HOSPITAL ENCOUNTER (OUTPATIENT)
Facility: MEDICAL CENTER | Age: 65
End: 2020-05-20
Payer: COMMERCIAL

## 2020-05-22 LAB
SARS-COV-2 RNA SPEC QL NAA+PROBE: NOT DETECTED
SPECIMEN SOURCE: NORMAL

## 2020-06-22 ENCOUNTER — TELEPHONE (OUTPATIENT)
Dept: HEALTH INFORMATION MANAGEMENT | Facility: OTHER | Age: 65
End: 2020-06-22

## 2020-06-22 NOTE — TELEPHONE ENCOUNTER
1. Caller Name:Wilfredo Montanez               Call Back Number: [phone]  [facility] PCP or Specialty Provider: Yes         2.  In the last two weeks, has the patient had any new or worsening symptoms (not explained by alternative diagnosis)? Yes, the patient reports the following COVID-19 consistent symptoms: fever of at least 100.4°F (38°C) or greater and headache, since last.     3.  Does patient have any comoribidities? None     4.  Has the patient traveled in the last 14 days OR had any known contact with someone who is suspected or confirmed to have COVID-19?  No.    5. Disposition: Advised to perform self care, monitor for worsening symptoms and to call back in 3 days if no improvement , offered [facility] phone.   Note routed to [facility] Provider: MAKI only.

## 2020-06-25 ENCOUNTER — HOSPITAL ENCOUNTER (EMERGENCY)
Facility: MEDICAL CENTER | Age: 65
End: 2020-06-25
Attending: EMERGENCY MEDICINE | Admitting: EMERGENCY MEDICINE
Payer: COMMERCIAL

## 2020-06-25 ENCOUNTER — OFFICE VISIT (OUTPATIENT)
Dept: URGENT CARE | Facility: CLINIC | Age: 65
End: 2020-06-25
Payer: COMMERCIAL

## 2020-06-25 ENCOUNTER — APPOINTMENT (OUTPATIENT)
Dept: RADIOLOGY | Facility: MEDICAL CENTER | Age: 65
End: 2020-06-25
Attending: EMERGENCY MEDICINE
Payer: COMMERCIAL

## 2020-06-25 VITALS
WEIGHT: 273.81 LBS | HEART RATE: 86 BPM | TEMPERATURE: 100.3 F | SYSTOLIC BLOOD PRESSURE: 121 MMHG | HEIGHT: 72 IN | RESPIRATION RATE: 20 BRPM | DIASTOLIC BLOOD PRESSURE: 81 MMHG | OXYGEN SATURATION: 91 % | BODY MASS INDEX: 37.09 KG/M2

## 2020-06-25 VITALS
DIASTOLIC BLOOD PRESSURE: 76 MMHG | SYSTOLIC BLOOD PRESSURE: 124 MMHG | HEART RATE: 91 BPM | TEMPERATURE: 99.1 F | WEIGHT: 267 LBS | RESPIRATION RATE: 16 BRPM | HEIGHT: 72 IN | BODY MASS INDEX: 36.16 KG/M2 | OXYGEN SATURATION: 93 %

## 2020-06-25 DIAGNOSIS — R10.32 LEFT LOWER QUADRANT ABDOMINAL PAIN: ICD-10-CM

## 2020-06-25 DIAGNOSIS — R50.9 FEBRILE ILLNESS, ACUTE: ICD-10-CM

## 2020-06-25 DIAGNOSIS — R50.9 FEVER, UNSPECIFIED FEVER CAUSE: ICD-10-CM

## 2020-06-25 LAB
ALBUMIN SERPL BCP-MCNC: 4.1 G/DL (ref 3.2–4.9)
ALBUMIN/GLOB SERPL: 1.4 G/DL
ALP SERPL-CCNC: 70 U/L (ref 30–99)
ALT SERPL-CCNC: 25 U/L (ref 2–50)
ANION GAP SERPL CALC-SCNC: 14 MMOL/L (ref 7–16)
APPEARANCE UR: CLEAR
AST SERPL-CCNC: 23 U/L (ref 12–45)
BASOPHILS # BLD AUTO: 0.3 % (ref 0–1.8)
BASOPHILS # BLD: 0.02 K/UL (ref 0–0.12)
BILIRUB SERPL-MCNC: 0.8 MG/DL (ref 0.1–1.5)
BILIRUB UR STRIP-MCNC: NORMAL MG/DL
BUN SERPL-MCNC: 11 MG/DL (ref 8–22)
CALCIUM SERPL-MCNC: 8.7 MG/DL (ref 8.4–10.2)
CHLORIDE SERPL-SCNC: 104 MMOL/L (ref 96–112)
CO2 SERPL-SCNC: 20 MMOL/L (ref 20–33)
COLOR UR AUTO: NORMAL
CREAT SERPL-MCNC: 0.77 MG/DL (ref 0.5–1.4)
EOSINOPHIL # BLD AUTO: 0.19 K/UL (ref 0–0.51)
EOSINOPHIL NFR BLD: 2.9 % (ref 0–6.9)
ERYTHROCYTE [DISTWIDTH] IN BLOOD BY AUTOMATED COUNT: 44.1 FL (ref 35.9–50)
GLOBULIN SER CALC-MCNC: 3 G/DL (ref 1.9–3.5)
GLUCOSE SERPL-MCNC: 99 MG/DL (ref 65–99)
GLUCOSE UR STRIP.AUTO-MCNC: NORMAL MG/DL
HCT VFR BLD AUTO: 47.1 % (ref 42–52)
HGB BLD-MCNC: 16.3 G/DL (ref 14–18)
IMM GRANULOCYTES # BLD AUTO: 0.03 K/UL (ref 0–0.11)
IMM GRANULOCYTES NFR BLD AUTO: 0.5 % (ref 0–0.9)
KETONES UR STRIP.AUTO-MCNC: NORMAL MG/DL
LACTATE BLD-SCNC: 1.4 MMOL/L (ref 0.5–2)
LEUKOCYTE ESTERASE UR QL STRIP.AUTO: NORMAL
LIPASE SERPL-CCNC: 20 U/L (ref 7–58)
LYMPHOCYTES # BLD AUTO: 1.09 K/UL (ref 1–4.8)
LYMPHOCYTES NFR BLD: 16.4 % (ref 22–41)
MCH RBC QN AUTO: 31.4 PG (ref 27–33)
MCHC RBC AUTO-ENTMCNC: 34.6 G/DL (ref 33.7–35.3)
MCV RBC AUTO: 90.8 FL (ref 81.4–97.8)
MONOCYTES # BLD AUTO: 0.54 K/UL (ref 0–0.85)
MONOCYTES NFR BLD AUTO: 8.1 % (ref 0–13.4)
NEUTROPHILS # BLD AUTO: 4.77 K/UL (ref 1.82–7.42)
NEUTROPHILS NFR BLD: 71.8 % (ref 44–72)
NITRITE UR QL STRIP.AUTO: NORMAL
NRBC # BLD AUTO: 0 K/UL
NRBC BLD-RTO: 0 /100 WBC
PH UR STRIP.AUTO: 5.5 [PH] (ref 5–8)
PLATELET # BLD AUTO: 139 K/UL (ref 164–446)
PMV BLD AUTO: 10.6 FL (ref 9–12.9)
POTASSIUM SERPL-SCNC: 3.7 MMOL/L (ref 3.6–5.5)
PROT SERPL-MCNC: 7.1 G/DL (ref 6–8.2)
PROT UR QL STRIP: NORMAL MG/DL
RBC # BLD AUTO: 5.19 M/UL (ref 4.7–6.1)
RBC UR QL AUTO: NORMAL
SODIUM SERPL-SCNC: 138 MMOL/L (ref 135–145)
SP GR UR STRIP.AUTO: 1.02
UROBILINOGEN UR STRIP-MCNC: 0.2 MG/DL
WBC # BLD AUTO: 6.6 K/UL (ref 4.8–10.8)

## 2020-06-25 PROCEDURE — 80053 COMPREHEN METABOLIC PANEL: CPT

## 2020-06-25 PROCEDURE — 83605 ASSAY OF LACTIC ACID: CPT

## 2020-06-25 PROCEDURE — 700102 HCHG RX REV CODE 250 W/ 637 OVERRIDE(OP): Performed by: EMERGENCY MEDICINE

## 2020-06-25 PROCEDURE — 85025 COMPLETE CBC W/AUTO DIFF WBC: CPT

## 2020-06-25 PROCEDURE — A9270 NON-COVERED ITEM OR SERVICE: HCPCS | Performed by: EMERGENCY MEDICINE

## 2020-06-25 PROCEDURE — 81002 URINALYSIS NONAUTO W/O SCOPE: CPT | Performed by: PHYSICIAN ASSISTANT

## 2020-06-25 PROCEDURE — 83690 ASSAY OF LIPASE: CPT

## 2020-06-25 PROCEDURE — 700117 HCHG RX CONTRAST REV CODE 255: Performed by: EMERGENCY MEDICINE

## 2020-06-25 PROCEDURE — 99284 EMERGENCY DEPT VISIT MOD MDM: CPT

## 2020-06-25 PROCEDURE — 36415 COLL VENOUS BLD VENIPUNCTURE: CPT

## 2020-06-25 PROCEDURE — 74177 CT ABD & PELVIS W/CONTRAST: CPT

## 2020-06-25 PROCEDURE — 99214 OFFICE O/P EST MOD 30 MIN: CPT | Performed by: PHYSICIAN ASSISTANT

## 2020-06-25 RX ORDER — ACETAMINOPHEN 500 MG
1000 TABLET ORAL ONCE
Status: COMPLETED | OUTPATIENT
Start: 2020-06-25 | End: 2020-06-25

## 2020-06-25 RX ADMIN — IOHEXOL 100 ML: 350 INJECTION, SOLUTION INTRAVENOUS at 19:31

## 2020-06-25 RX ADMIN — ACETAMINOPHEN 1000 MG: 500 TABLET, FILM COATED ORAL at 19:09

## 2020-06-25 ASSESSMENT — ENCOUNTER SYMPTOMS
BLOOD IN STOOL: 0
NAUSEA: 1
VOMITING: 0
CONSTIPATION: 1
ABDOMINAL PAIN: 1
FEVER: 1
SORE THROAT: 0
SHORTNESS OF BREATH: 0
COUGH: 0
CHILLS: 1
FLANK PAIN: 0
DIARRHEA: 0

## 2020-06-26 NOTE — ED NOTES
Med per erp, aware of need for urine spec and pending ct. Results noted of urine from u/c today -will update erp

## 2020-06-26 NOTE — ED PROVIDER NOTES
ED Provider Note    CHIEF COMPLAINT  Chief Complaint   Patient presents with   • Abdominal Pain     LLQ Since Sunday   • Fever     Since Saturday T-max 100.6       HPI  Wilfredo Montanez is a 64 y.o. male who presents for evaluation of a fever and left lower quadrant pain.  Patient notes he started having pain on Sunday and notes that for about 4 days before he had been constipated.  He took a couple doses of Colace on Saturday and had a few large bowel movements but still had onset of pain on Sunday.  He is noted intermittent fevers which she has been controlling with Tylenol and out of concern for COVID-19, the patient was tested yesterday at the [facility] testing facility.  Results are still pending.  Patient has no nasal congestion, sore throat, cough, chest pain, or shortness of breath.  He has no known exposure to COVID.  He is worried more he may have diverticulitis.  He notes that he does not always have pain in the left lower quadrant and it comes and goes especially with body movement.  He has had no blood in the stool and no hematuria or dysuria.    REVIEW OF SYSTEMS  Constitutional: Fevers noted  Skin: No rashes  HEENT: \No sore throat, runny nose, sores, trouble swallowing, trouble speaking.  Neck: No neck pain  Chest: No pain   Pulm: No shortness of breath, cough, wheezing, stridor, or pain with inspiration/expiration  Gastrointestinal: Mild nausea.  No vomiting, diarrhea, constipation, bloating, melena, hematochezia   Genitourinary: No dysuria or hematuria  Musculoskeletal: No recent trauma, pain, swelling, weakness  Neurologic: No sensory or motor changes. No confusion or disorientation.  Heme: No bleeding or bruising problems.   Immuno: No hx of recurrent infections      PAST MEDICAL HISTORY   has a past medical history of Allergic rhinitis, Cholinesterase deficiency (HCC), GERD (gastroesophageal reflux disease), Hyperlipidemia, Ischemic colitis (HCC), Obesity, and Skin cancer (2008).    SOCIAL  HISTORY  Social History     Tobacco Use   • Smoking status: Former Smoker     Last attempt to quit: 1981     Years since quittin.4   • Smokeless tobacco: Never Used   Substance and Sexual Activity   • Alcohol use: Yes     Alcohol/week: 0.0 oz     Comment: occasional   • Drug use: No   • Sexual activity: Not on file       SURGICAL HISTORY   has a past surgical history that includes other abdominal surgery; appendectomy laparoscopic (2012); colon resection (); and appendectomy.    CURRENT MEDICATIONS  Home Medications    **Home medications have not yet been reviewed for this encounter**         ALLERGIES  Allergies   Allergen Reactions   • Codeine    • Niacin      Statin-niacin medication. Gum swelling, blisters, myalgias.   • Statins [Hmg-Coa-R Inhibitors]        PHYSICAL EXAM  VITAL SIGNS: /81   Pulse 86   Temp 37.9 °C (100.3 °F) (Temporal)   Resp 20   Ht 1.829 m (6')   Wt 124.2 kg (273 lb 13 oz)   SpO2 91%   BMI 37.14 kg/m²    Gen: Alert in no apparent distress.  HEENT: No signs of trauma, Bilateral external ears normal, Nose normal. Conjunctiva normal, Non-icteric.   Neck:  No tenderness, Supple, No masses  Lymphatic: No cervical lymphadenopathy noted.   Cardiovascular: Regular rate and rhythm, no murmurs.  Capillary refill less than 3 seconds to all extremities, 2+ distal pulses.  Thorax & Lungs: Normal breath sounds, No respiratory distress, No wheezing bilateral chest rise  Abdomen: Bowel sounds normal, Soft, no significant tenderness, No masses, No pulsatile masses. No Guarding or rebound  Skin: Warm, Dry, No erythema, No rash noted to exposed areas.   Extremities: Intact distal pulses, No edema  Neurologic: Alert , no facial droop, grossly normal coordination and strength    LABS  Results for orders placed or performed during the hospital encounter of 20   CBC WITH DIFFERENTIAL   Result Value Ref Range    WBC 6.6 4.8 - 10.8 K/uL    RBC 5.19 4.70 - 6.10 M/uL    Hemoglobin  16.3 14.0 - 18.0 g/dL    Hematocrit 47.1 42.0 - 52.0 %    MCV 90.8 81.4 - 97.8 fL    MCH 31.4 27.0 - 33.0 pg    MCHC 34.6 33.7 - 35.3 g/dL    RDW 44.1 35.9 - 50.0 fL    Platelet Count 139 (L) 164 - 446 K/uL    MPV 10.6 9.0 - 12.9 fL    Neutrophils-Polys 71.80 44.00 - 72.00 %    Lymphocytes 16.40 (L) 22.00 - 41.00 %    Monocytes 8.10 0.00 - 13.40 %    Eosinophils 2.90 0.00 - 6.90 %    Basophils 0.30 0.00 - 1.80 %    Immature Granulocytes 0.50 0.00 - 0.90 %    Nucleated RBC 0.00 /100 WBC    Neutrophils (Absolute) 4.77 1.82 - 7.42 K/uL    Lymphs (Absolute) 1.09 1.00 - 4.80 K/uL    Monos (Absolute) 0.54 0.00 - 0.85 K/uL    Eos (Absolute) 0.19 0.00 - 0.51 K/uL    Baso (Absolute) 0.02 0.00 - 0.12 K/uL    Immature Granulocytes (abs) 0.03 0.00 - 0.11 K/uL    NRBC (Absolute) 0.00 K/uL   COMP METABOLIC PANEL   Result Value Ref Range    Sodium 138 135 - 145 mmol/L    Potassium 3.7 3.6 - 5.5 mmol/L    Chloride 104 96 - 112 mmol/L    Co2 20 20 - 33 mmol/L    Anion Gap 14.0 7.0 - 16.0    Glucose 99 65 - 99 mg/dL    Bun 11 8 - 22 mg/dL    Creatinine 0.77 0.50 - 1.40 mg/dL    Calcium 8.7 8.4 - 10.2 mg/dL    AST(SGOT) 23 12 - 45 U/L    ALT(SGPT) 25 2 - 50 U/L    Alkaline Phosphatase 70 30 - 99 U/L    Total Bilirubin 0.8 0.1 - 1.5 mg/dL    Albumin 4.1 3.2 - 4.9 g/dL    Total Protein 7.1 6.0 - 8.2 g/dL    Globulin 3.0 1.9 - 3.5 g/dL    A-G Ratio 1.4 g/dL   LIPASE   Result Value Ref Range    Lipase 20 7 - 58 U/L   LACTIC ACID   Result Value Ref Range    Lactic Acid 1.4 0.5 - 2.0 mmol/L   ESTIMATED GFR   Result Value Ref Range    GFR If African American >60 >60 mL/min/1.73 m 2    GFR If Non African American >60 >60 mL/min/1.73 m 2       RADIOLOGY  CT-ABDOMEN-PELVIS WITH   Final Result      1.  Negative contrast-enhanced CT of the abdomen and pelvis.      2.  Sigmoid diverticulosis without CT evidence of diverticulitis.            COURSE & MEDICAL DECISION MAKING  Patient arrives for evaluation of a fever and reported left lower quadrant  pain however the patient has no pain on my evaluation and is not significantly tender in the left lower quadrant.  I do still have a concern for diverticulitis and I will therefore order a CT scan.  Patient has absolutely no findings to suggest a respiratory illness but certainly has a fever today.  He does not have a headache, neck pain and I do not suspect meningitis or encephalitis.  Given that he has no respiratory findings or symptoms, I do not feel chest x-ray will  today.  Patient stated understanding of the plan for CT imaging and laboratory evaluation.    8:38 PM  Patient appears nontoxic and I feel his labs and imaging are reassuring.  There is no convincing findings to suggest a source for his fever and I feel he is safe for discharge for empiric treatment.  He does have a COVID-19 test currently pending [facility] the state lab.  I do not feel he needs admission for this and I do not feel he needs a stat test.  He stated clear understanding that if his symptoms worsen or change he should return immediately for reevaluation and especially if he develops respiratory symptoms.  FINAL IMPRESSION  1. Fever, unspecified fever cause    2. Left lower quadrant abdominal pain        Electronically signed by: Ulysses Helton M.D., 6/25/2020 6:54 PM

## 2020-06-26 NOTE — PROGRESS NOTES
Subjective:   Wilfredo Montanez  is a 64 y.o. male who presents for Abdominal Pain (LLQ pain, fever x5 days )    This is a new problem.  Patient presents to urgent care with 6-day history of fever with T-max of 100.6 for which she has been taking Tylenol and ibuprofen.  Patient reports that when he began to experience fever he felt constipated and used Colace and did ultimately produce a bowel movement.  However, the day after the onset of fever he started to begin to experience left lower quadrant abdominal pain which has progressively been worsening.  The patient has had some nausea but no vomiting.  Denies diarrhea, hematochezia or melena.  Denies dysuria, urgency or frequency with urination.    Patient became concerned about possible COVID-19 due to the fact that his wife is high risk and did present to the health department yesterday for testing with test results pending.  He reports he has been told this will be 5 to 6 days.  Patient denies any cough or shortness of breath, loss of taste or smell, nasal congestion or sore throat, myalgias.  Patient has had no known exposure to anyone positive for COVID-19 are concerned to have put COVID-19 and he has not traveled outside the area in the past 14 days.    Patient did reach out to his primary care provider today who informed him she was concerned about possible diverticulitis and that he should present for further evaluation.  Patient has no prior history of diverticulitis.  However, chart review does reveal that he does have prior history of ischemic colitis.      Abdominal Pain   Associated symptoms include constipation, a fever and nausea. Pertinent negatives include no diarrhea, dysuria, frequency, hematuria, melena or vomiting.     Review of Systems   Constitutional: Positive for chills and fever. Negative for malaise/fatigue.   HENT: Negative for congestion, ear pain and sore throat.    Respiratory: Negative for cough and shortness of breath.     Gastrointestinal: Positive for abdominal pain, constipation and nausea. Negative for blood in stool, diarrhea, melena and vomiting.   Genitourinary: Negative for dysuria, flank pain, frequency, hematuria and urgency.   All other systems reviewed and are negative.    Allergies   Allergen Reactions   • Codeine    • Niacin      Statin-niacin medication. Gum swelling, blisters, myalgias.   • Statins [Hmg-Coa-R Inhibitors]      Reviewed past medical, surgical , social and family history.  Reviewed prescription and over-the-counter medications with patient and electronic health record today.     Objective:   /76   Pulse 91   Temp 37.3 °C (99.1 °F) (Oral)   Resp 16   Ht 1.829 m (6')   Wt 121.1 kg (267 lb)   SpO2 93%   BMI 36.21 kg/m²   Physical Exam  Vitals signs reviewed.   Constitutional:       Appearance: He is well-developed. He is not ill-appearing or toxic-appearing.   HENT:      Head: Normocephalic and atraumatic.      Right Ear: Tympanic membrane, ear canal and external ear normal.      Left Ear: Tympanic membrane, ear canal and external ear normal.      Nose: Nose normal.      Mouth/Throat:      Lips: Pink.      Mouth: Mucous membranes are moist.      Pharynx: Uvula midline. No oropharyngeal exudate.   Eyes:      Conjunctiva/sclera: Conjunctivae normal.      Pupils: Pupils are equal, round, and reactive to light.   Neck:      Musculoskeletal: Normal range of motion and neck supple.   Cardiovascular:      Rate and Rhythm: Normal rate and regular rhythm.      Heart sounds: Normal heart sounds. No murmur. No friction rub.   Pulmonary:      Effort: Pulmonary effort is normal. No respiratory distress.      Breath sounds: Normal breath sounds.   Abdominal:      General: Abdomen is protuberant. Bowel sounds are normal.      Palpations: Abdomen is soft.      Tenderness: There is abdominal tenderness in the left lower quadrant. There is no right CVA tenderness, left CVA tenderness, guarding or rebound.    Musculoskeletal: Normal range of motion.   Lymphadenopathy:      Head:      Right side of head: No submental, submandibular or tonsillar adenopathy.      Left side of head: No submental, submandibular or tonsillar adenopathy.      Cervical: No cervical adenopathy.      Upper Body:      Right upper body: No supraclavicular adenopathy.      Left upper body: No supraclavicular adenopathy.   Skin:     General: Skin is warm and dry.      Findings: No rash.   Neurological:      Mental Status: He is alert and oriented to person, place, and time.      Cranial Nerves: Cranial nerves are intact. No cranial nerve deficit.      Sensory: Sensation is intact. No sensory deficit.      Motor: Motor function is intact.      Coordination: Coordination is intact. Coordination normal.      Gait: Gait is intact.   Psychiatric:         Attention and Perception: Attention normal.         Mood and Affect: Mood normal.         Speech: Speech normal.         Behavior: Behavior normal.         Thought Content: Thought content normal.         Judgment: Judgment normal.           Assessment/Plan:   1. Left lower quadrant abdominal pain  - POCT Urinalysis: Negative leukocyte esterase, negative nitrites, negative blood    2. Febrile illness, acute  - POCT Urinalysis       Given the patient's presentation I do believe he warrants further evaluation at a higher level of care as I am concerned about the possibility of diverticulitis with fever and would be unable to perform an appropriate evaluation on an outpatient basis given the late hour of the day..  I did reach out to [facility] emergency department and spoke with Dr. Haskins to confirm that it would be acceptable to send him to that location.  They are more than happy to see him.    Upon entering exam room I ensured patient was wearing a mask.  This provider wore a mask for the entire visit.  Patient wore mask entire visit except for a brief period while examining  oropharynx.      Differential diagnosis, natural history, supportive care, and indications for immediate follow-up discussed.     Red flag warning symptoms and strict ER/follow-up precautions given.  The patient demonstrated a good understanding and agreed with the treatment plan.  Please note that this note was created using voice recognition speech to text software. Every effort has been made to correct obvious errors.  However, I expect there are errors of grammar and possibly context that were not discovered prior to finalizing the note  LANDEN Nelson PA-C

## 2020-06-26 NOTE — ED NOTES
Dc instructions reviewed with pt. To f/u with pcp, continue with tylenol po, return for worsening s/s

## 2020-10-15 ENCOUNTER — IMMUNIZATION (OUTPATIENT)
Dept: SOCIAL WORK | Facility: CLINIC | Age: 65
End: 2020-10-15
Payer: COMMERCIAL

## 2020-10-15 DIAGNOSIS — Z23 NEED FOR VACCINATION: ICD-10-CM

## 2020-10-15 PROCEDURE — 90662 IIV NO PRSV INCREASED AG IM: CPT | Performed by: REGISTERED NURSE

## 2020-10-15 PROCEDURE — 90471 IMMUNIZATION ADMIN: CPT | Performed by: REGISTERED NURSE

## 2020-12-08 ENCOUNTER — TELEPHONE (OUTPATIENT)
Dept: MEDICAL GROUP | Facility: MEDICAL CENTER | Age: 65
End: 2020-12-08

## 2020-12-08 ENCOUNTER — HOSPITAL ENCOUNTER (OUTPATIENT)
Dept: RADIOLOGY | Facility: MEDICAL CENTER | Age: 65
End: 2020-12-08
Attending: NURSE PRACTITIONER
Payer: COMMERCIAL

## 2020-12-08 ENCOUNTER — OFFICE VISIT (OUTPATIENT)
Dept: MEDICAL GROUP | Facility: MEDICAL CENTER | Age: 65
End: 2020-12-08
Payer: COMMERCIAL

## 2020-12-08 VITALS
OXYGEN SATURATION: 94 % | HEART RATE: 55 BPM | WEIGHT: 285 LBS | TEMPERATURE: 98.1 F | SYSTOLIC BLOOD PRESSURE: 126 MMHG | BODY MASS INDEX: 38.6 KG/M2 | DIASTOLIC BLOOD PRESSURE: 78 MMHG | HEIGHT: 72 IN

## 2020-12-08 DIAGNOSIS — M25.562 ACUTE PAIN OF LEFT KNEE: ICD-10-CM

## 2020-12-08 DIAGNOSIS — Z12.5 SCREENING FOR MALIGNANT NEOPLASM OF PROSTATE: ICD-10-CM

## 2020-12-08 DIAGNOSIS — G89.29 CHRONIC PAIN OF LEFT KNEE: ICD-10-CM

## 2020-12-08 DIAGNOSIS — D69.6 THROMBOCYTOPENIA (HCC): ICD-10-CM

## 2020-12-08 DIAGNOSIS — M17.12 ARTHRITIS OF LEFT KNEE: ICD-10-CM

## 2020-12-08 DIAGNOSIS — E78.5 DYSLIPIDEMIA: ICD-10-CM

## 2020-12-08 DIAGNOSIS — E66.9 OBESITY (BMI 35.0-39.9 WITHOUT COMORBIDITY): ICD-10-CM

## 2020-12-08 DIAGNOSIS — M25.562 CHRONIC PAIN OF LEFT KNEE: ICD-10-CM

## 2020-12-08 PROCEDURE — 73564 X-RAY EXAM KNEE 4 OR MORE: CPT | Mod: LT

## 2020-12-08 PROCEDURE — 99214 OFFICE O/P EST MOD 30 MIN: CPT | Performed by: NURSE PRACTITIONER

## 2020-12-08 ASSESSMENT — FIBROSIS 4 INDEX: FIB4 SCORE: 2.18

## 2020-12-08 ASSESSMENT — PATIENT HEALTH QUESTIONNAIRE - PHQ9: CLINICAL INTERPRETATION OF PHQ2 SCORE: 0

## 2020-12-08 NOTE — PROGRESS NOTES
Subjective:     Wilfredo Montanez is a 65 y.o. male who presents with left knee pain.    HPI:   Seen in f/u for left knee pain.  He has been wearing a brace. It was getting better but now for the last 3-4 wks it is getting worse. Yesterday he almost could not work.  That is unusual for him.  The pain is mostly in the medial knee.  Worse after sitting for awhile and when trying to sleep.    He has not been on a healthy diet recently.  He was doing well with diet and exercise until wife became ill.  She is working out 3x/wk but he doesn't have energy to go.  He works very hard physically at his job.   He was in ER in june for COVID.  When they did lab in ER his CBC showed low plts.    He was on lipitor and lopid but had significant s/e.  He stopped thos meds.  He had blisters and myalgia with advacor.  He is due updated lab for his prostate.      Patient Active Problem List    Diagnosis Date Noted   • Obesity (BMI 35.0-39.9 without comorbidity) ([facility]) 12/08/2020   • Obesity (BMI 30-39.9) 02/06/2018   • Cholinesterase deficiency ([facility])    • Preventative health care 06/14/2012   • GERD (gastroesophageal reflux disease)    • Skin cancer    • Hyperlipidemia    • Ischemic colitis (HCC)    • Obesity 06/01/2012   • Dyslipidemia 06/01/2012       Current medicines (including changes today)  Current Outpatient Medications   Medication Sig Dispense Refill   • Cholecalciferol (VITAMIN D3) 3000 UNITS TABS Take  by mouth.     • aspirin (ASA) 325 MG TABS Take 325 mg by mouth as needed.     • docosahexanoic acid (OMEGA 3 FA) 1000 MG CAPS Take 1,000 mg by mouth 3 times a day, with meals.     • glucosamine Sulfate 500 MG CAPS Take 500 mg by mouth 3 times a day, with meals.     • ascorbic acid (ASCORBIC ACID) 500 MG TABS Take 500 mg by mouth every day.       No current facility-administered medications for this visit.        Allergies   Allergen Reactions   • Codeine    • Niacin      Statin-niacin medication. Gum swelling, blisters,  myalgias.   • Statins [Hmg-Coa-R Inhibitors]        ROS  Constitutional: Negative. Negative for fever, chills, weight loss, malaise/fatigue and diaphoresis.   HENT: Negative. Negative for hearing loss, ear pain, nosebleeds, congestion, sore throat, neck pain, tinnitus and ear discharge.   Respiratory: Negative. Negative for cough, hemoptysis, sputum production, shortness of breath, wheezing and stridor.   Cardiovascular: Negative. Negative for chest pain, palpitations, orthopnea, claudication, leg swelling and PND.   Gastrointestinal: Denies nausea, vomiting, diarrhea, constipation, heartburn, melena or hematochezia.  Genitourinary: Denies dysuria, hematuria, urinary incontinence, frequency or urgency.        Objective:     /78 (BP Location: Right arm, Patient Position: Sitting, BP Cuff Size: Adult)   Pulse (!) 55   Temp 36.7 °C (98.1 °F) (Temporal)   Ht 1.829 m (6')   Wt (!) 129.3 kg (285 lb)   SpO2 94%  Body mass index is 38.65 kg/m².    Physical Exam:  Vitals reviewed.  Constitutional: Oriented to person, place, and time. appears well-developed and well-nourished. No distress.   Cardiovascular: Normal rate, regular rhythm, normal heart sounds and intact distal pulses. Exam reveals no gallop and no friction rub. No murmur heard. No carotid bruits.   Pulmonary/Chest: Effort normal and breath sounds normal. No stridor. No respiratory distress. no wheezes or rales. exhibits no tenderness.   Musculoskeletal: Normal range of motion. exhibits no edema. bert pedal pulses 2+.  Pain with palp left medial knee.  Negative drawer, nai and lachman test.   Lymphadenopathy: No cervical or supraclavicular adenopathy.   Neurological: Alert and oriented to person, place, and time. exhibits normal muscle tone.  Skin: Skin is warm and dry. No diaphoresis.   Psychiatric: Normal mood and affect. Behavior is normal.      Assessment and Plan:     The following treatment plan was discussed:    1. Acute pain of left knee   DX-KNEE COMPLETE 4+ LEFT    xray left knee.  f/u w/pt w/results.  do lab and f/u for review 1 mo   2. Dyslipidemia  Lipid Profile    recheck lab and f/u for review.  intolerant of statins.  enc pt to improve healthy diet   3. Thrombocytopenia (HCC)  CBC WITH DIFFERENTIAL    was seen on CBC in june when in ER w/COVID.  no sx of bleeding.  recheck cbc with lab.    4. Screening for malignant neoplasm of prostate  PSA TOTAL + %FREE   5. Obesity (BMI 35.0-39.9 without comorbidity)  Patient identified as having weight management issue.  Appropriate orders and counseling given.         Followup: Return in about 4 weeks (around 1/5/2021).

## 2020-12-09 NOTE — TELEPHONE ENCOUNTER
Please let pt know that the knee xray shows mild to mod OA.  i will refer to ortho if he would like.  Let me know

## 2020-12-12 ENCOUNTER — HOSPITAL ENCOUNTER (OUTPATIENT)
Dept: LAB | Facility: MEDICAL CENTER | Age: 65
End: 2020-12-12
Attending: NURSE PRACTITIONER
Payer: COMMERCIAL

## 2020-12-12 DIAGNOSIS — E78.5 DYSLIPIDEMIA: ICD-10-CM

## 2020-12-12 DIAGNOSIS — Z12.5 SCREENING FOR MALIGNANT NEOPLASM OF PROSTATE: ICD-10-CM

## 2020-12-12 DIAGNOSIS — D69.6 THROMBOCYTOPENIA (HCC): ICD-10-CM

## 2020-12-12 LAB
BASOPHILS # BLD AUTO: 0.9 % (ref 0–1.8)
BASOPHILS # BLD: 0.06 K/UL (ref 0–0.12)
CHOLEST SERPL-MCNC: 231 MG/DL (ref 100–199)
EOSINOPHIL # BLD AUTO: 0.14 K/UL (ref 0–0.51)
EOSINOPHIL NFR BLD: 2 % (ref 0–6.9)
ERYTHROCYTE [DISTWIDTH] IN BLOOD BY AUTOMATED COUNT: 46 FL (ref 35.9–50)
FASTING STATUS PATIENT QL REPORTED: NORMAL
HCT VFR BLD AUTO: 50.4 % (ref 42–52)
HDLC SERPL-MCNC: 29 MG/DL
HGB BLD-MCNC: 17.1 G/DL (ref 14–18)
IMM GRANULOCYTES # BLD AUTO: 0.01 K/UL (ref 0–0.11)
IMM GRANULOCYTES NFR BLD AUTO: 0.1 % (ref 0–0.9)
LDLC SERPL CALC-MCNC: 146 MG/DL
LYMPHOCYTES # BLD AUTO: 1.93 K/UL (ref 1–4.8)
LYMPHOCYTES NFR BLD: 27.4 % (ref 22–41)
MCH RBC QN AUTO: 32.6 PG (ref 27–33)
MCHC RBC AUTO-ENTMCNC: 33.9 G/DL (ref 33.7–35.3)
MCV RBC AUTO: 96 FL (ref 81.4–97.8)
MONOCYTES # BLD AUTO: 0.61 K/UL (ref 0–0.85)
MONOCYTES NFR BLD AUTO: 8.7 % (ref 0–13.4)
NEUTROPHILS # BLD AUTO: 4.29 K/UL (ref 1.82–7.42)
NEUTROPHILS NFR BLD: 60.9 % (ref 44–72)
NRBC # BLD AUTO: 0 K/UL
NRBC BLD-RTO: 0 /100 WBC
PLATELET # BLD AUTO: 211 K/UL (ref 164–446)
PMV BLD AUTO: 11.2 FL (ref 9–12.9)
RBC # BLD AUTO: 5.25 M/UL (ref 4.7–6.1)
TRIGL SERPL-MCNC: 280 MG/DL (ref 0–149)
WBC # BLD AUTO: 7 K/UL (ref 4.8–10.8)

## 2020-12-12 PROCEDURE — 80061 LIPID PANEL: CPT

## 2020-12-12 PROCEDURE — 36415 COLL VENOUS BLD VENIPUNCTURE: CPT

## 2020-12-12 PROCEDURE — 85025 COMPLETE CBC W/AUTO DIFF WBC: CPT

## 2020-12-12 PROCEDURE — 84153 ASSAY OF PSA TOTAL: CPT

## 2020-12-12 PROCEDURE — 84154 ASSAY OF PSA FREE: CPT

## 2020-12-15 ENCOUNTER — TELEPHONE (OUTPATIENT)
Dept: MEDICAL GROUP | Facility: MEDICAL CENTER | Age: 65
End: 2020-12-15

## 2020-12-15 LAB
PSA FREE MFR SERPL: 29 %
PSA FREE SERPL-MCNC: 0.4 NG/ML
PSA SERPL-MCNC: 1.4 NG/ML (ref 0–4)

## 2020-12-16 NOTE — TELEPHONE ENCOUNTER
----- Message from DANIEL Lino sent at 12/15/2020  2:34 PM PST -----  Please have pt set appointment to review and discuss treatment for labs.

## 2020-12-21 ENCOUNTER — TELEMEDICINE (OUTPATIENT)
Dept: MEDICAL GROUP | Facility: MEDICAL CENTER | Age: 65
End: 2020-12-21
Payer: COMMERCIAL

## 2020-12-21 VITALS — WEIGHT: 274 LBS | HEIGHT: 72 IN | BODY MASS INDEX: 37.11 KG/M2

## 2020-12-21 DIAGNOSIS — E78.5 HYPERLIPIDEMIA LDL GOAL <100: ICD-10-CM

## 2020-12-21 PROCEDURE — 99214 OFFICE O/P EST MOD 30 MIN: CPT | Mod: 95,CR | Performed by: NURSE PRACTITIONER

## 2020-12-21 ASSESSMENT — FIBROSIS 4 INDEX: FIB4 SCORE: 1.44

## 2020-12-22 NOTE — PROGRESS NOTES
Virtual Visit: Established Patient   This visit was conducted via phone call using secure and encrypted videoconferencing technology. The patient was in a private location in the state of [place].    The patient's identity was confirmed and verbal consent was obtained for this virtual visit.    Subjective:   CC: hyperlipidemia    Wilfredo Montanez is a 65 y.o. male presenting for evaluation and management of:  Hyperlipidemia.    He has a hx of hyperlipidemia.  He does not exercise.  He is very active at work.  He was doing exercise until sept when his wife had a CVA.  His highest trg was up to 600 in the past.  He has already discussed with his wife about restarting healthy diet and restarting exercise.   Reviewed lab with pt.  CBC, PSA is wnl.   LP shows trg up from 243 to 280.  HDL chronically low in 20's.  Now at 29 and stable.  LDL is up from 119 to 146.  He does not dunia statins.  Had significant s/e in past.  Not on med    ROS   Review of Systems   Constitutional: Negative.  Negative for fever, chills, weight loss, malaise/fatigue and diaphoresis.   HENT: Negative.  Negative for hearing loss, ear pain, nosebleeds, congestion, sore throat, neck pain, tinnitus and ear discharge.    Respiratory: Negative.  Negative for cough, hemoptysis, sputum production, shortness of breath, wheezing and stridor.    Cardiovascular: Negative.  Negative for chest pain, palpitations, orthopnea, claudication, leg swelling and PND.   Gastrointestinal: denies nausea, vomiting, diarrhea, constipation, heartburn, melena or hematochezia.  Genitourinary: Denies dysuria, hematuria, urinary incontinence, frequency or urgency.    Musculoskeletal: Negative.  Negative for myalgias and back pain.   Neurological: Negative.  Negative for dizziness, tingling, tremors, weakness and headaches.   Psych:  Denies depression, anxiety or insomnia.  All other systems reviewed and are negative.      Allergies   Allergen Reactions   • Codeine    • Niacin       Statin-niacin medication. Gum swelling, blisters, myalgias.   • Statins [Hmg-Coa-R Inhibitors]        Current medicines (including changes today)  Current Outpatient Medications   Medication Sig Dispense Refill   • Cholecalciferol (VITAMIN D3) 3000 UNITS TABS Take  by mouth.     • aspirin (ASA) 325 MG TABS Take 325 mg by mouth as needed.     • docosahexanoic acid (OMEGA 3 FA) 1000 MG CAPS Take 1,000 mg by mouth 3 times a day, with meals.     • glucosamine Sulfate 500 MG CAPS Take 500 mg by mouth 3 times a day, with meals.     • ascorbic acid (ASCORBIC ACID) 500 MG TABS Take 500 mg by mouth every day.       No current facility-administered medications for this visit.        Patient Active Problem List    Diagnosis Date Noted   • Obesity (BMI 35.0-39.9 without comorbidity) (HCC) 12/08/2020   • Obesity (BMI 30-39.9) 02/06/2018   • Cholinesterase deficiency (HCC)    • Preventative health care 06/14/2012   • GERD (gastroesophageal reflux disease)    • Skin cancer    • Hyperlipidemia    • Ischemic colitis (HCC)    • Obesity 06/01/2012   • Dyslipidemia 06/01/2012       Family History   Problem Relation Age of Onset   • Other Mother         cholinesterase deficiency   • Psychiatric Illness Mother    • Other Sister         cholinesterase deficiency   • Hypertension Father    • Heart Disease Father    • Stroke Father    • Hyperlipidemia Father    • Heart Disease Maternal Grandmother    • Heart Disease Maternal Grandfather    • Cancer Paternal Grandmother    • Cancer Paternal Grandfather        He  has a past medical history of Allergic rhinitis, Cholinesterase deficiency (HCC), GERD (gastroesophageal reflux disease), Hyperlipidemia, Ischemic colitis (HCC), Obesity, and Skin cancer (2008).  He  has a past surgical history that includes other abdominal surgery; appendectomy laparoscopic (5/31/2012); colon resection (2007); and appendectomy.       Objective:   Ht 1.829 m (6')   Wt 124.3 kg (274 lb)   BMI 37.16 kg/m²      Physical Exam:  Constitutional: Alert, no distress, well-groomed.  Skin: No rashes in visible areas.  Eye: Round. Conjunctiva clear, lids normal. No icterus.   ENMT: Lips pink without lesions, good dentition, moist mucous membranes. Phonation normal.  Neck: No masses, no thyromegaly. Moves freely without pain.  Respiratory: Unlabored respiratory effort, no cough or audible wheeze  Psych: Alert and oriented x3, normal affect and mood.       Assessment and Plan:   The following treatment plan was discussed:     1. Hyperlipidemia LDL goal <100  Lipid Profile    intolerant of statins.  enc pt to improve healthy diet and exercise.  recheck LP in 3-4 mo.  f/u for review.          Follow-up: Return in about 4 months (around 4/21/2021).

## 2022-10-31 PROBLEM — E66.9 OBESITY (BMI 30-39.9): Status: ACTIVE | Noted: 2022-10-31

## 2023-11-06 PROBLEM — M79.89 LEG SWELLING: Status: ACTIVE | Noted: 2023-11-06
